# Patient Record
Sex: FEMALE | Race: OTHER | Employment: UNEMPLOYED | URBAN - METROPOLITAN AREA
[De-identification: names, ages, dates, MRNs, and addresses within clinical notes are randomized per-mention and may not be internally consistent; named-entity substitution may affect disease eponyms.]

---

## 2017-10-02 PROBLEM — Z34.80 PRENATAL CARE OF MULTIGRAVIDA, ANTEPARTUM: Status: ACTIVE | Noted: 2017-10-02

## 2018-01-22 PROBLEM — O34.219 PREVIOUS CESAREAN DELIVERY AFFECTING PREGNANCY: Status: ACTIVE | Noted: 2018-01-22

## 2018-02-15 PROBLEM — O99.012 ANEMIA DURING PREGNANCY IN SECOND TRIMESTER: Status: ACTIVE | Noted: 2018-02-15

## 2018-05-07 ENCOUNTER — ANESTHESIA EVENT (OUTPATIENT)
Dept: LABOR AND DELIVERY | Age: 33
End: 2018-05-07
Payer: COMMERCIAL

## 2018-05-07 ENCOUNTER — ANESTHESIA (OUTPATIENT)
Dept: LABOR AND DELIVERY | Age: 33
End: 2018-05-07
Payer: COMMERCIAL

## 2018-05-07 ENCOUNTER — HOSPITAL ENCOUNTER (INPATIENT)
Age: 33
LOS: 2 days | Discharge: HOME | End: 2018-05-09
Attending: OBSTETRICS & GYNECOLOGY | Admitting: OBSTETRICS & GYNECOLOGY
Payer: COMMERCIAL

## 2018-05-07 DIAGNOSIS — Z37.9 NORMAL LABOR: Primary | ICD-10-CM

## 2018-05-07 LAB
ABO + RH BLD: NORMAL
BLOOD BANK CMNT PATIENT-IMP: NORMAL
BLOOD GROUP ANTIBODIES SERPL: NORMAL
ERYTHROCYTE [DISTWIDTH] IN BLOOD BY AUTOMATED COUNT: 17.6 % (ref 11.9–14.6)
HCT VFR BLD AUTO: 41.5 % (ref 35.8–46.3)
HGB BLD-MCNC: 13.8 G/DL (ref 11.7–15.4)
MCH RBC QN AUTO: 28.2 PG (ref 26.1–32.9)
MCHC RBC AUTO-ENTMCNC: 33.3 G/DL (ref 31.4–35)
MCV RBC AUTO: 84.9 FL (ref 79.6–97.8)
PLATELET # BLD AUTO: 212 K/UL (ref 150–450)
PMV BLD AUTO: 12.3 FL (ref 10.8–14.1)
RBC # BLD AUTO: 4.89 M/UL (ref 4.05–5.25)
SPECIMEN EXP DATE BLD: NORMAL
WBC # BLD AUTO: 15.3 K/UL (ref 4.3–11.1)

## 2018-05-07 PROCEDURE — 65270000029 HC RM PRIVATE

## 2018-05-07 PROCEDURE — 36415 COLL VENOUS BLD VENIPUNCTURE: CPT | Performed by: OBSTETRICS & GYNECOLOGY

## 2018-05-07 PROCEDURE — 0KQM0ZZ REPAIR PERINEUM MUSCLE, OPEN APPROACH: ICD-10-PCS | Performed by: OBSTETRICS & GYNECOLOGY

## 2018-05-07 PROCEDURE — 77030003028 HC SUT VCRL J&J -A

## 2018-05-07 PROCEDURE — 77030010848 HC CATH INTUTR PRSS KOLB -B

## 2018-05-07 PROCEDURE — 77030011943

## 2018-05-07 PROCEDURE — 10907ZC DRAINAGE OF AMNIOTIC FLUID, THERAPEUTIC FROM PRODUCTS OF CONCEPTION, VIA NATURAL OR ARTIFICIAL OPENING: ICD-10-PCS | Performed by: OBSTETRICS & GYNECOLOGY

## 2018-05-07 PROCEDURE — 82803 BLOOD GASES ANY COMBINATION: CPT

## 2018-05-07 PROCEDURE — 77030014125 HC TY EPDRL BBMI -B: Performed by: ANESTHESIOLOGY

## 2018-05-07 PROCEDURE — 74011258636 HC RX REV CODE- 258/636: Performed by: OBSTETRICS & GYNECOLOGY

## 2018-05-07 PROCEDURE — 99283 EMERGENCY DEPT VISIT LOW MDM: CPT

## 2018-05-07 PROCEDURE — 86900 BLOOD TYPING SEROLOGIC ABO: CPT | Performed by: OBSTETRICS & GYNECOLOGY

## 2018-05-07 PROCEDURE — 74011250636 HC RX REV CODE- 250/636: Performed by: ANESTHESIOLOGY

## 2018-05-07 PROCEDURE — 77030011945 HC CATH URIN INT ST MENT -A

## 2018-05-07 PROCEDURE — 74011250636 HC RX REV CODE- 250/636: Performed by: OBSTETRICS & GYNECOLOGY

## 2018-05-07 PROCEDURE — 74011250637 HC RX REV CODE- 250/637: Performed by: ANESTHESIOLOGY

## 2018-05-07 PROCEDURE — 4A1HXCZ MONITORING OF PRODUCTS OF CONCEPTION, CARDIAC RATE, EXTERNAL APPROACH: ICD-10-PCS | Performed by: OBSTETRICS & GYNECOLOGY

## 2018-05-07 PROCEDURE — A4300 CATH IMPL VASC ACCESS PORTAL: HCPCS | Performed by: ANESTHESIOLOGY

## 2018-05-07 PROCEDURE — 74011250636 HC RX REV CODE- 250/636

## 2018-05-07 PROCEDURE — 77030018846 HC SOL IRR STRL H20 ICUM -A

## 2018-05-07 PROCEDURE — 85027 COMPLETE CBC AUTOMATED: CPT | Performed by: OBSTETRICS & GYNECOLOGY

## 2018-05-07 PROCEDURE — 77030005518 HC CATH URETH FOL 2W BARD -B

## 2018-05-07 RX ORDER — DIPHENHYDRAMINE HCL 25 MG
25 CAPSULE ORAL
Status: DISCONTINUED | OUTPATIENT
Start: 2018-05-07 | End: 2018-05-09 | Stop reason: HOSPADM

## 2018-05-07 RX ORDER — BUTORPHANOL TARTRATE 2 MG/ML
1 INJECTION INTRAMUSCULAR; INTRAVENOUS
Status: DISCONTINUED | OUTPATIENT
Start: 2018-05-07 | End: 2018-05-08 | Stop reason: HOSPADM

## 2018-05-07 RX ORDER — SODIUM CHLORIDE 0.9 % (FLUSH) 0.9 %
5-10 SYRINGE (ML) INJECTION AS NEEDED
Status: DISCONTINUED | OUTPATIENT
Start: 2018-05-07 | End: 2018-05-08 | Stop reason: HOSPADM

## 2018-05-07 RX ORDER — MINERAL OIL
120 OIL (ML) ORAL
Status: DISCONTINUED | OUTPATIENT
Start: 2018-05-07 | End: 2018-05-08 | Stop reason: HOSPADM

## 2018-05-07 RX ORDER — SODIUM CHLORIDE 0.9 % (FLUSH) 0.9 %
5-10 SYRINGE (ML) INJECTION AS NEEDED
Status: DISCONTINUED | OUTPATIENT
Start: 2018-05-07 | End: 2018-05-08 | Stop reason: ALTCHOICE

## 2018-05-07 RX ORDER — NALOXONE HYDROCHLORIDE 0.4 MG/ML
0.4 INJECTION, SOLUTION INTRAMUSCULAR; INTRAVENOUS; SUBCUTANEOUS AS NEEDED
Status: DISCONTINUED | OUTPATIENT
Start: 2018-05-07 | End: 2018-05-09 | Stop reason: HOSPADM

## 2018-05-07 RX ORDER — OXYTOCIN/0.9 % SODIUM CHLORIDE 15/250 ML
250 PLASTIC BAG, INJECTION (ML) INTRAVENOUS ONCE
Status: COMPLETED | OUTPATIENT
Start: 2018-05-07 | End: 2018-05-07

## 2018-05-07 RX ORDER — FAMOTIDINE 20 MG/1
20 TABLET, FILM COATED ORAL ONCE
Status: COMPLETED | OUTPATIENT
Start: 2018-05-07 | End: 2018-05-07

## 2018-05-07 RX ORDER — DOCUSATE SODIUM 100 MG/1
100 CAPSULE, LIQUID FILLED ORAL 2 TIMES DAILY
Status: DISCONTINUED | OUTPATIENT
Start: 2018-05-08 | End: 2018-05-09 | Stop reason: HOSPADM

## 2018-05-07 RX ORDER — DEXTROSE, SODIUM CHLORIDE, SODIUM LACTATE, POTASSIUM CHLORIDE, AND CALCIUM CHLORIDE 5; .6; .31; .03; .02 G/100ML; G/100ML; G/100ML; G/100ML; G/100ML
125 INJECTION, SOLUTION INTRAVENOUS CONTINUOUS
Status: DISCONTINUED | OUTPATIENT
Start: 2018-05-07 | End: 2018-05-08 | Stop reason: ALTCHOICE

## 2018-05-07 RX ORDER — OXYCODONE HYDROCHLORIDE 5 MG/1
10 TABLET ORAL
Status: DISCONTINUED | OUTPATIENT
Start: 2018-05-07 | End: 2018-05-09 | Stop reason: HOSPADM

## 2018-05-07 RX ORDER — SIMETHICONE 80 MG
80 TABLET,CHEWABLE ORAL
Status: DISCONTINUED | OUTPATIENT
Start: 2018-05-07 | End: 2018-05-09 | Stop reason: HOSPADM

## 2018-05-07 RX ORDER — ROPIVACAINE HYDROCHLORIDE 2 MG/ML
INJECTION, SOLUTION EPIDURAL; INFILTRATION; PERINEURAL
Status: DISCONTINUED | OUTPATIENT
Start: 2018-05-07 | End: 2018-05-07 | Stop reason: HOSPADM

## 2018-05-07 RX ORDER — SODIUM CHLORIDE 0.9 % (FLUSH) 0.9 %
5-10 SYRINGE (ML) INJECTION EVERY 8 HOURS
Status: DISCONTINUED | OUTPATIENT
Start: 2018-05-07 | End: 2018-05-08 | Stop reason: ALTCHOICE

## 2018-05-07 RX ORDER — LIDOCAINE HYDROCHLORIDE 10 MG/ML
1 INJECTION INFILTRATION; PERINEURAL
Status: DISCONTINUED | OUTPATIENT
Start: 2018-05-07 | End: 2018-05-08 | Stop reason: HOSPADM

## 2018-05-07 RX ORDER — PROMETHAZINE HYDROCHLORIDE 25 MG/1
25 TABLET ORAL
Status: DISCONTINUED | OUTPATIENT
Start: 2018-05-07 | End: 2018-05-09 | Stop reason: HOSPADM

## 2018-05-07 RX ORDER — ACETAMINOPHEN 325 MG/1
650 TABLET ORAL
Status: DISCONTINUED | OUTPATIENT
Start: 2018-05-07 | End: 2018-05-09 | Stop reason: HOSPADM

## 2018-05-07 RX ORDER — HYDROMORPHONE HYDROCHLORIDE 2 MG/ML
1 INJECTION, SOLUTION INTRAMUSCULAR; INTRAVENOUS; SUBCUTANEOUS
Status: DISCONTINUED | OUTPATIENT
Start: 2018-05-07 | End: 2018-05-08 | Stop reason: ALTCHOICE

## 2018-05-07 RX ORDER — SODIUM CHLORIDE 0.9 % (FLUSH) 0.9 %
5-10 SYRINGE (ML) INJECTION EVERY 8 HOURS
Status: DISCONTINUED | OUTPATIENT
Start: 2018-05-07 | End: 2018-05-08 | Stop reason: HOSPADM

## 2018-05-07 RX ORDER — ZOLPIDEM TARTRATE 5 MG/1
5 TABLET ORAL
Status: DISCONTINUED | OUTPATIENT
Start: 2018-05-07 | End: 2018-05-09 | Stop reason: HOSPADM

## 2018-05-07 RX ORDER — LIDOCAINE HYDROCHLORIDE 20 MG/ML
JELLY TOPICAL
Status: DISCONTINUED | OUTPATIENT
Start: 2018-05-07 | End: 2018-05-08 | Stop reason: HOSPADM

## 2018-05-07 RX ADMIN — OXYTOCIN 15000 MILLI-UNITS/HR: 10 INJECTION, SOLUTION INTRAMUSCULAR; INTRAVENOUS at 23:30

## 2018-05-07 RX ADMIN — FAMOTIDINE 20 MG: 20 TABLET ORAL at 20:19

## 2018-05-07 RX ADMIN — SODIUM CHLORIDE, SODIUM LACTATE, POTASSIUM CHLORIDE, CALCIUM CHLORIDE, AND DEXTROSE MONOHYDRATE 125 ML/HR: 600; 310; 30; 20; 5 INJECTION, SOLUTION INTRAVENOUS at 19:14

## 2018-05-07 RX ADMIN — SODIUM CHLORIDE, SODIUM LACTATE, POTASSIUM CHLORIDE, AND CALCIUM CHLORIDE 1000 ML: 600; 310; 30; 20 INJECTION, SOLUTION INTRAVENOUS at 19:00

## 2018-05-07 RX ADMIN — ROPIVACAINE HYDROCHLORIDE 10 ML/HR: 2 INJECTION, SOLUTION EPIDURAL; INFILTRATION; PERINEURAL at 19:17

## 2018-05-07 NOTE — H&P
The patient is a 28 y.o. female who is seen for act labor tolac 4cm marcos . HISTORY:      Patient's last menstrual period was 07/31/2017 (exact date). Sexual History:    Contraception:    No current facility-administered medications on file prior to encounter. Current Outpatient Prescriptions on File Prior to Encounter   Medication Sig Dispense Refill    AMBULATORY BREAST PUMP Use as directed for feedings. 1 Pump(s) 0    ferrous sulfate (SLOW FE) 142 mg (45 mg iron) ER tablet Take  by mouth Daily (before breakfast).  ascorbic acid, vitamin C, (VITAMIN C) 500 mg tablet Take 1 Tab by mouth daily. 30 Tab 12    PNV 67-iron ps-folate no. 1-dha (VITAFOL ULTRA) 29 mg iron- 1 mg-200 mg cap Take 1 Tab by mouth daily. 90 Cap 4       ROS:  Feeling well. No dyspnea or chest pain on exertion. No abdominal pain, change in bowel habits, black or bloody stools. No urinary tract symptoms. GYN ROS: .    PHYSICAL EXAM:  Last menstrual period 07/31/2017. The patient appears well, alert, oriented x 3, in no distress. Lungs are clear. Heart RRR, no murmurs. Abdomen soft without tenderness, guarding, mass or organomegaly. Pelvic: . ASSESSMENT:  No diagnosis found. PLAN:    Orders Placed This Encounter    NO VTE PROPHYLAXIS NEEDED     Standing Status:   Standing     Number of Occurrences:   1     Order Specific Question:   Due to     Answer:   Prophylaxis is Not Indicated    INITIATE PROTOCOL     Standing Status:   Standing     Number of Occurrences:   1     Order Specific Question:   Initiate Protocol     Answer:   Labor / Vaginal Delivery    CBC W/O DIFF     Standing Status:   Standing     Number of Occurrences:   1    DIET NPO With Ice Chips     And sips     Standing Status:   Standing     Number of Occurrences:   1     Order Specific Question:   NPO options: Answer: With [employer]    VITAL SIGNS PER UNIT ROUTINE     More frequently if indicated.      Standing Status:   Standing     Number of Occurrences:   1    AMBULATE PATIENT     Ambulate prior to ROM per unit protocol. Standing Status:   Standing     Number of Occurrences:   1    FETAL MONITORING     Standing Status:   Standing     Number of Occurrences:   1    NOTIFY PROVIDER: VITAL SIGNS CHANGES     FHT's Greater than 160 Beats Per Minute or less than 110 Beats Per Minute     Standing Status:   Standing     Number of Occurrences:   1     Order Specific Question:   Notify for Temperature: Answer:   Greater than 100.4 F     Order Specific Question:   Notify for Heart Rate: Answer:   Greater than 120 Beats Per Minute     Order Specific Question:   Notify for Respiratory Rate: Answer:   Greater than 30     Order Specific Question:   Notify for Systolic Blood Pressure: Answer:   Greater than 160  mm Hg or Less than 90 mm Hg    EPIDURAL CATHETER CARE     Catheter Care and Management     Standing Status:   Standing     Number of Occurrences:   1    INTAKE AND OUTPUT     Standing Status:   Standing     Number of Occurrences:   1    STRAIGHT CATHETER (NURSING) PRN Routine As needed for bladder distention/management if epidural is in place. As needed for bladder distention/management if epidural is in place. Standing Status:   Standing     Number of Occurrences:   1    FULL CODE     Standing Status:   Standing     Number of Occurrences:   1    IP CONSULT TO ANESTHESIOLOGY     Standing Status:   Standing     Number of Occurrences:   1     Order Specific Question:   Reason for Consult: Answer: For epidural placement as needed. Order Specific Question:   Did you call or speak to the consulting provider? Answer: Yes    TYPE AND SCREEN     ENTER SURGERY DATE IF FOR PRE-OP TESTING. Standing Status:   Standing     Number of Occurrences:   1     Order Specific Question:   Has patient been transfused or pregnant in the last 3 mos. ?      Answer:   Unknown    SALINE LOCK IV ONE TIME Routine     Standing Status: Standing     Number of Occurrences:   1    dextrose 5% lactated ringers infusion    lactated ringers bolus infusion 500 mL    sodium chloride (NS) flush 5-10 mL    sodium chloride (NS) flush 5-10 mL    oxytocin (PITOCIN) 15 units/250 ml NS    butorphanol (STADOL) injection 1 mg    lidocaine (XYLOCAINE) 10 mg/mL (1 %) injection 0.1 mL    mineral oil 120 mL    lidocaine (XYLOCAINE) 2 % jelly    INITIAL PHYSICIAN ORDER: INPATIENT L&D; 3. Patient receiving treatment that can only be provided in an inpatient setting (further clarification in H&P documentation)     Standing Status:   Standing     Number of Occurrences:   1     Order Specific Question:   Status: Answer:   Inpatient [101]     Order Specific Question:   Type of Bed     Answer:   L&D [7]     Order Specific Question:   Inpatient Hospitalization Certified Necessary for the Following Reasons     Answer:   3.  Patient receiving treatment that can only be provided in an inpatient setting (further clarification in H&P documentation)     Order Specific Question:   Admitting Diagnosis     Answer:   Normal labor [[id number]]     Order Specific Question:   Admitting Physician     Answer:   Bill Daniels     Order Specific Question:   Attending Physician     Answer:   Nataly Ochoa [[id number]]     Order Specific Question:   Estimated Length of Stay     Answer:   2 Midnights     Order Specific Question:   Discharge Plan:     Answer:   Home with Office Follow-up     4cm tolac

## 2018-05-07 NOTE — IP AVS SNAPSHOT
[address] [facility] [address] [facility] [address] 
[phone] Patient: GLENN HARRISON MRN: [id number] MFS:[id number] About your hospitalization You were admitted on: May 7, 2018 You last received care in the:  [address] [facility] You were discharged on:  May 9, 2018 Why you were hospitalized Your primary diagnosis was:  Not on File Your diagnoses also included:  Normal Labor Follow-up Information Follow up With Details Comments Contact Info Lea Alcaraz MD Schedule an appointment as soon as possible for a visit in 6 weeks  [address] [address] 
[phone] Ghada Dooley MD   Patient can only remember the practice name and not the physician Your Scheduled Appointments Wednesday June 20, 2018  9:45 AM EDT POSTPARTUM VISIT with Fernie Paris MD  
[facility] ([facility]) [address] [address] [phone] Discharge Orders None A check laz indicates which time of day the medication should be taken. My Medications START taking these medications Instructions Each Dose to Equal  
 Morning Noon Evening Bedtime  
 ibuprofen 800 mg tablet Commonly known as:  MOTRIN Your last dose was: Your next dose is: Take 1 Tab by mouth every eight (8) hours as needed for Pain. 800 mg  
    
   
   
   
  
 oxyCODONE IR 5 mg immediate release tablet Commonly known as:  [place] Your last dose was: Your next dose is: Take 1 Tab by mouth every four (4) hours as needed for Pain. Max Daily Amount: 30 mg.  
 5 mg CONTINUE taking these medications Instructions Each Dose to Equal  
 Morning Noon Evening Bedtime AMBULATORY BREAST PUMP Your last dose was: Your next dose is:    
   
   
 Use as directed for feedings. ascorbic acid (vitamin C) 500 mg tablet Commonly known as:  VITAMIN C Your last dose was: Your next dose is: Take 1 Tab by mouth daily. 500 mg PNV 67-iron ps-folate no. 1-dha 29 mg iron- 1 mg-200 mg Cap Commonly known as:  [place] Your last dose was: Your next dose is: Take 1 Tab by mouth daily. 1 Tab  
    
   
   
   
  
 SLOW  mg (45 mg iron) ER tablet Generic drug:  ferrous sulfate Your last dose was: Your next dose is: Take  by mouth Daily (before breakfast). Where to Get Your Medications These medications were sent to [facility] #[id number]  [place] [address], [address] [place] [address] Phone:  [phone]  
  ibuprofen 800 mg tablet Information on where to get these meds will be given to you by the nurse or doctor. ! Ask your nurse or doctor about these medications  
  oxyCODONE IR 5 mg immediate release tablet Opioid Education Prescription Opioids: What You Need to Know: 
 
Prescription opioids can be used to help relieve moderate-to-severe pain and are often prescribed following a surgery or injury, or for certain health conditions. These medications can be an important part of treatment but also come with serious risks. Opioids are strong pain medicines. Examples include hydrocodone, oxycodone, fentanyl, and morphine. Heroin is an example of an illegal opioid. It is important to work with your health care provider to make sure you are getting the safest, most effective care. WHAT ARE THE RISKS AND SIDE EFFECTS OF OPIOID USE? Prescription opioids carry serious risks of addiction and overdose, especially with prolonged use.   An opioid overdose, often marked by slow breathing, can cause sudden death. The use of prescription opioids can have a number of side effects as well, even when taken as directed. · Tolerance-meaning you might need to take more of a medication for the same pain relief · Physical dependence-meaning you have symptoms of withdrawal when the medication is stopped. Withdrawal symptoms can include nausea, sweating, chills, diarrhea, stomach cramps, and muscle aches. Withdrawal can last up to several weeks, depending on which drug you took and how long you took it. · Increased sensitivity to pain · Constipation · Nausea, vomiting, and dry mouth · Sleepiness and dizziness · Confusion · Depression · Low levels of testosterone that can result in lower sex drive, energy, and strength · Itching and sweating RISKS ARE GREATER WITH:      
· History of drug misuse, substance use disorder, or overdose · Mental health conditions (such as depression or anxiety) · Sleep apnea · Older age (72 years or older) · Pregnancy Avoid alcohol while taking prescription opioids. Also, unless specifically advised by your health care provider, medications to avoid include: · Benzodiazepines (such as Xanax or Valium) · Muscle relaxants (such as Soma or Flexeril) · Hypnotics (such as Ambien or Lunesta) · Other prescription opioids KNOW YOUR OPTIONS Talk to your health care provider about ways to manage your pain that don't involve prescription opioids. Some of these options may actually work better and have fewer risks and side effects. Options may include: 
· Pain relievers such as acetaminophen, ibuprofen, and naproxen · Some medications that are also used for depression or seizures · Physical therapy and exercise · Counseling to help patients learn how to cope better with triggers of pain and stress. · Application of heat or cold compress · Massage therapy · Relaxation techniques Be Informed Make sure you know the name of your medication, how much and how often to take it, and its potential risks & side effects. IF YOU ARE PRESCRIBED OPIOIDS FOR PAIN: 
· Never take opioids in greater amounts or more often than prescribed. Remember the goal is not to be pain-free but to manage your pain at a tolerable level. · Follow up with your primary care provider to: · Work together to create a plan on how to manage your pain. · Talk about ways to help manage your pain that don't involve prescription opioids. · Talk about any and all concerns and side effects. · Help prevent misuse and abuse. · Never sell or share prescription opioids · Help prevent misuse and abuse. · Store prescription opioids in a secure place and out of reach of others (this may include visitors, children, friends, and family). · Safely dispose of unused/unwanted prescription opioids: Find your community drug take-back program or your pharmacy mail-back program, or flush them down the toilet, following guidance from the Food and Drug Administration (www.fda.gov/Drugs/ResourcesForYou). · Visit www.cdc.gov/drugoverdose to learn about the risks of opioid abuse and overdose. · If you believe you may be struggling with addiction, tell your health care provider and ask for guidance or call [employer] at 8-509-930-XXAD. Discharge Instructions After Your Delivery (the Postpartum Period): Care Instructions Your Care Instructions Congratulations on the birth of your baby. Like pregnancy, the  period can be a time of excitement, tri, and exhaustion. You may look at your wondrous little baby and feel happy. You may also be overwhelmed by your new sleep hours and new responsibilities. At first, babies often sleep during the days and are awake at night. They do not have a pattern or routine.  They may make sudden gasps, jerk themselves awake, or look like they have crossed eyes. These are all normal, and they may even make you smile. In these first weeks after delivery, try to take good care of yourself. It may take 4 to 6 weeks to feel like yourself again, and possibly longer if you had a  birth. You will likely feel very tired for several weeks. Your days will be full of ups and downs, but lots of tri as well. Follow-up care is a key part of your treatment and safety. Be sure to make and go to all appointments, and call your doctor if you are having problems. It's also a good idea to know your test results and keep a list of the medicines you take. How can you care for yourself at home? Take care of your body after delivery · Use pads instead of tampons for the bloody flow that may last as long as 2 weeks. · Ease cramps with ibuprofen (Advil, Motrin). · Ease soreness of hemorrhoids and the area between your vagina and rectum with ice compresses or witch hazel pads. · Ease constipation by drinking lots of fluid and eating high-fiber foods. Ask your doctor about over-the-counter stool softeners. · Cleanse yourself with a gentle squeeze of warm water from a bottle instead of wiping with toilet paper. · Take a sitz bath in warm water several times a day. · Wear a good nursing bra. Ease sore and swollen breasts with warm, wet washcloths. · If you are not breastfeeding, use ice rather than heat for breast soreness. · Your period may not start for several months if you are breastfeeding. You may bleed more, and longer at first, than you did before you got pregnant. · Wait until you are healed (about 4 to 6 weeks) before you have sexual intercourse. Your doctor will tell you when it is okay to have sex. · Try not to travel with your baby for 5 or 6 weeks. If you take a long car trip, make frequent stops to walk around and stretch. Avoid exhaustion · Rest every day. Try to nap when your baby naps. · Ask another adult to be with you for a few days after delivery. · Plan for  if you have other children. · Stay flexible so you can eat at odd hours and sleep when you need to. Both you and your baby are making new schedules. · Plan small trips to get out of the house. Change can make you feel less tired. · Ask for help with housework, cooking, and shopping. Remind yourself that your job is to care for your baby. Know about help for postpartum depression · \"Baby blues\" are common for the first 1 to 2 weeks after birth. You may cry or feel sad or irritable for no reason. · Rest whenever you can. Being tired makes it harder to handle your emotions. · Go for walks with your baby. · Talk to your partner, friends, and family about your feelings. · If your symptoms last for more than a few weeks, or if you feel very depressed, ask your doctor for help. · Postpartum depression can be treated. Support groups and counseling can help. Sometimes medicine can also help. Stay healthy · Eat healthy foods so you have more energy, make good breast milk, and lose extra baby pounds. · If you breastfeed, avoid alcohol and drugs. Stay smoke-free. If you quit during pregnancy, congratulations. · Start daily exercise after 4 to 6 weeks, but rest when you feel tired. · Learn exercises to tone your belly. Do Kegel exercises to regain strength in your pelvic muscles. You can do these exercises while you stand or sit. ¨ Squeeze the same muscles you would use to stop your urine. Your belly and thighs should not move. ¨ Hold the squeeze for 3 seconds, and then relax for 3 seconds. ¨ Start with 3 seconds. Then add 1 second each week until you are able to squeeze for 10 seconds. ¨ Repeat the exercise 10 to 15 times for each session. Do three or more sessions each day. · Find a class for new mothers and new babies that has an exercise time.  
· If you had a  birth, give yourself a bit more time before you exercise, and be careful. When should you call for help? Call 911 anytime you think you may need emergency care. For example, call if: 
? · You passed out (lost consciousness). ?Call your doctor now or seek immediate medical care if: 
? · You have severe vaginal bleeding. This means you are passing blood clots and soaking through a pad each hour for 2 or more hours. ? · You are dizzy or lightheaded, or you feel like you may faint. ? · You have a fever. ? · You have new belly pain, or your pain gets worse. ? Watch closely for changes in your health, and be sure to contact your doctor if: 
? · Your vaginal bleeding seems to be getting heavier. ? · You have new or worse vaginal discharge. ? · You feel sad, anxious, or hopeless for more than a few days. ? · You do not get better as expected. Where can you learn more? Go to http://devin-radha.info/. Enter A461 in the search box to learn more about \"After Your Delivery (the Postpartum Period): Care Instructions. \" Current as of: March 16, 2017 Content Version: 11.4 © 3592-8883 [employer]. Care instructions adapted under license by [employer] (which disclaims liability or warranty for this information). If you have questions about a medical condition or this instruction, always ask your healthcare professional. [address] any warranty or liability for your use of this information. DoubleMap Announcement We are excited to announce that we are making your provider's discharge notes available to you in DoubleMap. You will see these notes when they are completed and signed by the physician that discharged you from your recent hospital stay.   If you have any questions or concerns about any information you see in DoubleMap, please call the Health Information Department where you were seen or reach out to your Primary Care Provider for more information about your plan of care. Introducing [facility]! Dear Karlee Rosas: Thank you for requesting a TripletPlus account. Our records indicate that you already have an active TripletPlus account. You can access your account anytime at [email or web address] Did you know that you can access your hospital and ER discharge instructions at any time in TripletPlus? You can also review all of your test results from your hospital stay or ER visit. Additional Information If you have questions, please visit the Frequently Asked Questions section of the TripletPlus website at [email or web address] Remember, TripletPlus is NOT to be used for urgent needs. For medical emergencies, dial 911. Now available from your iPhone and Android! Introducing Igor Hampton As a [employer] patient, I wanted to make you aware of our electronic visit tool called Igor Hampton. [employer] 24/7 allows you to connect within minutes with a medical provider 24 hours a day, seven days a week via a mobile device or tablet or logging into a secure website from your computer. You can access Igor Hampton from anywhere in the [place]. A virtual visit might be right for you when you have a simple condition and feel like you just dont want to get out of bed, or cant get away from work for an appointment, when your regular [employer] provider is not available (evenings, weekends or holidays), or when youre out of town and need minor care. Electronic visits cost only $49 and if the [employer] 24/7 provider determines a prescription is needed to treat your condition, one can be electronically transmitted to a nearby pharmacy*. Please take a moment to enroll today if you have not already done so. The enrollment process is free and takes just a few minutes.   To enroll, please download the [employer] 24/7 zakiya to your tablet or phone, or visit [email or web address] to enroll on your computer. And, as an [address] [place] [address] patient with a [employer] account, the results of your visits will be scanned into your electronic medical record and your primary care provider will be able to view the scanned results. We urge you to continue to see your regular [facility] provider for your ongoing medical care. And while your primary care provider may not be the one available when you seek a [employer] virtual visit, the peace of mind you get from getting a real diagnosis real time can be priceless. For more information on [employer], view our Frequently Asked Questions (FAQs) at [email or web address]. Sincerely, 
 
Kaitlin Eastman MD 
Chief Medical Officer [facility] Gracie Mahmood *:  certain medications cannot be prescribed via Dexrex Gear Providers Seen During Your Hospitalization Provider Specialty Primary office phone Connie Poe MD Obstetrics & Gynecology [phone] Immunizations Administered for This Admission Name Date MMR 5/9/2018 Tdap  Deferred () Your Primary Care Physician (PCP) Primary Care Physician Office Phone Office Fax OTHER, PHYS ** None ** ** None ** You are allergic to the following No active allergies Recent Documentation Height Weight Breastfeeding? BMI OB Status Smoking Status 1.651 m 68.9 kg Yes 25.29 kg/m2 Recent pregnancy Never Smoker Emergency Contacts Name Discharge Info Relation Home Work Mobile rTung Singletary  Spouse [3] [phone] Patient Belongings The following personal items are in your possession at time of discharge: 
  Dental Appliances: None  Visual Aid: None      Home Medications: None   Jewelry: Earrings  Clothing: Pants, Shirt, With patient    Other Valuables: Cell Phone, Madalyn Bane, With patient  Personal Items Sent to Safe: none Please provide this summary of care documentation to your next provider. Signatures-by signing, you are acknowledging that this After Visit Summary has been reviewed with you and you have received a copy. Patient Signature:  ____________________________________________________________ Date:  ____________________________________________________________  
  
Rexann Ports Provider Signature:  ____________________________________________________________ Date:  ____________________________________________________________

## 2018-05-07 NOTE — IP AVS SNAPSHOT
[address] 
[phone] Patient: GLENN HARRISON MRN: [id number] FUO:[id number] A check laz indicates which time of day the medication should be taken. My Medications START taking these medications Instructions Each Dose to Equal  
 Morning Noon Evening Bedtime  
 ibuprofen 800 mg tablet Commonly known as:  MOTRIN Your last dose was: Your next dose is: Take 1 Tab by mouth every eight (8) hours as needed for Pain. 800 mg  
    
   
   
   
  
 oxyCODONE IR 5 mg immediate release tablet Commonly known as:  Maegan Cerrato Your last dose was: Your next dose is: Take 1 Tab by mouth every four (4) hours as needed for Pain. Max Daily Amount: 30 mg.  
 5 mg CONTINUE taking these medications Instructions Each Dose to Equal  
 Morning Noon Evening Bedtime AMBULATORY BREAST PUMP Your last dose was: Your next dose is:    
   
   
 Use as directed for feedings. ascorbic acid (vitamin C) 500 mg tablet Commonly known as:  VITAMIN C Your last dose was: Your next dose is: Take 1 Tab by mouth daily. 500 mg PNV 67-iron ps-folate no. 1-dha 29 mg iron- 1 mg-200 mg Cap Commonly known as:  Drew Deacon Your last dose was: Your next dose is: Take 1 Tab by mouth daily. 1 Tab  
    
   
   
   
  
 SLOW  mg (45 mg iron) ER tablet Generic drug:  ferrous sulfate Your last dose was: Your next dose is: Take  by mouth Daily (before breakfast). Where to Get Your Medications These medications were sent to [facility]/pharmacy #[id number] Beltre [address] [place] [address] 1284, [address] [facility] [address] Phone:  [phone]  
  ibuprofen 800 mg tablet Information on where to get these meds will be given to you by the nurse or doctor. ! Ask your nurse or doctor about these medications  
  oxyCODONE IR 5 mg immediate release tablet

## 2018-05-07 NOTE — ANESTHESIA PROCEDURE NOTES
Epidural Block    Start time: 5/7/2018 7:14 PM  End time: 5/7/2018 7:20 PM  Performed by: Zeb Seals by: Liset MENDOZA     Pre-Procedure  Indication: labor epidural    Preanesthetic Checklist: patient identified, risks and benefits discussed, anesthesia consent, site marked, patient being monitored, timeout performed and anesthesia consent    Timeout Time: 19:14        Epidural:   Patient position:  Seated  Prep region:  Lumbar  Prep: Chlorhexidine    Location:  L3-4    Needle and Epidural Catheter:   Needle Type:  Tuohy  Needle Gauge:  17 G  Injection Technique:  Loss of resistance using air  Attempts:  1  Catheter Size:  18 G  Depth in Epidural Space (cm):  3  Events: no blood with aspiration, no cerebrospinal fluid with aspiration, no paresthesia and negative aspiration test    Epidural test dose: .75% LIDOCAINE WITH EPI.     Assessment:   Catheter Secured:  Tegaderm and tape  Insertion:  Uncomplicated  Patient tolerance:  Patient tolerated the procedure well with no immediate complications

## 2018-05-07 NOTE — ANESTHESIA PREPROCEDURE EVALUATION
Anesthetic History               Review of Systems / Medical History  Patient summary reviewed, nursing notes reviewed and pertinent labs reviewed    Pulmonary                   Neuro/Psych              Cardiovascular                  Exercise tolerance: >4 METS     GI/Hepatic/Renal                Endo/Other             Other Findings              Physical Exam    Airway  Mallampati: II  TM Distance: 4 - 6 cm  Neck ROM: normal range of motion   Mouth opening: Normal     Cardiovascular  Regular rate and rhythm,  S1 and S2 normal,  no murmur, click, rub, or gallop             Dental  No notable dental hx       Pulmonary  Breath sounds clear to auscultation               Abdominal         Other Findings            Anesthetic Plan    ASA: 2  Anesthesia type: epidural          Induction: Intravenous  Anesthetic plan and risks discussed with: Patient

## 2018-05-08 LAB
BASE DEFICIT BLDCOA-SCNC: 1.8 MMOL/L (ref 0–2)
BASE DEFICIT BLDCOV-SCNC: 1.5 MMOL/L (ref 1.9–7.7)
BDY SITE: ABNORMAL
BDY SITE: ABNORMAL
HCO3 BLDCOA-SCNC: 25 MMOL/L (ref 22–26)
HCO3 BLDV-SCNC: 25 MMOL/L
PCO2 BLDCOA: 47 MMHG (ref 33–49)
PCO2 BLDCOV: 47 MMHG (ref 14.1–43.3)
PH BLDCOA: 7.33 [PH] (ref 7.21–7.31)
PH BLDCOV: 7.34 [PH] (ref 7.2–7.44)
PO2 BLDCOA: 29 MMHG (ref 9–19)
PO2 BLDV: 27 MMHG (ref 30.4–57.2)
SERVICE CMNT-IMP: ABNORMAL
SERVICE CMNT-IMP: ABNORMAL

## 2018-05-08 PROCEDURE — 75410000000 HC DELIVERY VAGINAL/SINGLE

## 2018-05-08 PROCEDURE — 65270000029 HC RM PRIVATE

## 2018-05-08 PROCEDURE — 75410000003 HC RECOV DEL/VAG/CSECN EA 0.5 HR

## 2018-05-08 PROCEDURE — 76060000078 HC EPIDURAL ANESTHESIA

## 2018-05-08 PROCEDURE — 74011250637 HC RX REV CODE- 250/637: Performed by: OBSTETRICS & GYNECOLOGY

## 2018-05-08 PROCEDURE — 75410000002 HC LABOR FEE PER 1 HR

## 2018-05-08 RX ADMIN — ACETAMINOPHEN 650 MG: 325 TABLET ORAL at 02:39

## 2018-05-08 RX ADMIN — DOCUSATE SODIUM 100 MG: 100 CAPSULE, LIQUID FILLED ORAL at 17:42

## 2018-05-08 RX ADMIN — ACETAMINOPHEN 650 MG: 325 TABLET ORAL at 06:36

## 2018-05-08 RX ADMIN — ACETAMINOPHEN 650 MG: 325 TABLET ORAL at 17:47

## 2018-05-08 RX ADMIN — DOCUSATE SODIUM 100 MG: 100 CAPSULE, LIQUID FILLED ORAL at 08:04

## 2018-05-08 RX ADMIN — OXYCODONE HYDROCHLORIDE 10 MG: 5 TABLET ORAL at 06:36

## 2018-05-08 RX ADMIN — OXYCODONE HYDROCHLORIDE 10 MG: 5 TABLET ORAL at 02:39

## 2018-05-08 RX ADMIN — OXYCODONE HYDROCHLORIDE 10 MG: 5 TABLET ORAL at 17:47

## 2018-05-08 RX ADMIN — WITCH HAZEL 1 PAD: 500 SOLUTION RECTAL; TOPICAL at 04:33

## 2018-05-08 NOTE — PROGRESS NOTES
Admission assessment complete. Pt resting in bed with family present. Encouraged pt to call out with any questions or concerns and for assistance before getting out of bed. Pt verbalizes understanding.

## 2018-05-08 NOTE — PROGRESS NOTES
Chart reviewed - no needs identified.  made introduction to family and provided informational packet on  mood disorder education/resources. Family receptive to receiving information and denied any additional needs from . Family has this 's contact information should any needs/questions arise.     Cezar Houston, [address] [facility]

## 2018-05-08 NOTE — PROGRESS NOTES
Pt has arrived to L&D with reports of contractions. Pt denies any leaking of fluids but has to stop and breath with her contractions and rates them an 8 out of 10. Plan of care discussed with pt and understanding was verbalized. Pt placed on EFM.

## 2018-05-08 NOTE — PROGRESS NOTES
Dr. Julie Hodgkin at bedside at 1927. SVE 4/90/-1. AROM clear, small to moderate fluid at 1930 and IUPC placed at 1931.

## 2018-05-08 NOTE — PROGRESS NOTES
of viable female infant at 2326. Infant vigorous with apgars 9/9. Placenta delivered at 4245 with no complications.

## 2018-05-08 NOTE — LACTATION NOTE
This note was copied from a baby's chart. First visit. 2nd time mom. Pumped and bottle fed x7 months with first child. Mom attempting to feed baby in cradle hold on left breast at time of visit. Infant latched to left breast but shallow. Assisted mom to re latch baby, compressing breast, for deeper latch. Infant fed x15 min. Mom wanted to switch sides. Demonstrated hand expression. Easily expressed drops. Encouraged cross cradle vs traditional cradle hold. Mom able to latch baby to right breast. Infant fed x10 min. Mom asked to stop and swaddle and baby so she could rest. Assisted mom to break latch, swaddled baby and placed in bassinet. Encouraged to watch for feeding cues, feed on demand. Encouraged at least 15-20 min on first breast and offer second breast at each feeding. Reviewed expectations for first 24 hours of life and baby's second night. Encouraged at least 8 feedings in 24 hours. Lactation to follow up tomorrow.

## 2018-05-08 NOTE — PROGRESS NOTES
Pt requests pain medication for verbalized pain level of 7/10. 650mg Tylenol and 10mg Roxicodone administered PO. RN to reassess.

## 2018-05-08 NOTE — LACTATION NOTE

## 2018-05-08 NOTE — PROGRESS NOTES
Post-Partum Day Number 1 Progress Note    Patient doing well post-partum without significant complaint. Voiding without difficulty, normal lochia. Vitals:  Patient Vitals for the past 8 hrs:   BP Temp Pulse Resp SpO2   18 0657 100/63 98.4 °F (36.9 °C) 62 16 98 %     Temp (24hrs), Av.8 °F (37.1 °C), Min:97.6 °F (36.4 °C), Max:99.8 °F (37.7 °C)      Vital signs stable, afebrile. Exam:  Patient without distress. Abdomen soft, fundus firm at level of umbilicus, nontender               Perineum with normal lochia noted. Lower extremities are negative for swelling, cords or tenderness. Lab/Data Review: All lab results for the last 24 hours reviewed.       Lab Results   Component Value Date/Time    ABO/Rh(D) A POSITIVE 2018 06:19 PM    Antibody screen, External Negative 2017    Antibody screen NEG 2018 06:19 PM    Rubella, External Non-Immune 2017    GrBStrep, External negative 2018    HBsAg, External Negative 2017    HIV, External Negative 2017    RPR, External Negative 2017    Gonorrhea, External Negative 2017    Chlamydia, External Negative 2017    ABO,Rh A Positive 2017      Problem List  Date Reviewed: 2018          Codes Class Noted    Normal labor ICD-10-CM: O80, Z37.9  ICD-9-CM: 650  2018        Anemia during pregnancy in second trimester ICD-10-CM: O99.012  ICD-9-CM: 648.23  2/15/2018    Overview Addendum 3/12/2018  8:37 AM by Brook Dorsey MD     hgb () 9.4  hgb electro wnl   POC hgb today 8.8 (3/12) at 32wks  reports compliance w/ QD FeSO4  Increase to BID w/ Vit C ; recheck NV--possible heme/onc referral if not responding              Previous  delivery affecting pregnancy ICD-10-CM: O34.219  ICD-9-CM: 654.20  2018    Overview Addendum 3/12/2018  8:11 AM by Brook Dorsey MD     Due to NRFHTs at term by pt description in [place]   Desires TOLAC   Placenta anterior               Prenatal care of multigravida, antepartum ICD-10-CM: Z34.80  ICD-9-CM: V22.1  10/2/2017    Overview Addendum 2/15/2018 10:21 AM by Colby King MD     1)prenatal labs nl. Nl hgb electro. Immune to VZ  2)rubella nonimmune  3)previous C/S. Wt 3.25kg. Sounds like fetal intolerance. Pt interested in . 4)ANEMIA                 Assessment and Plan:  Patient appears to be having uncomplicated post-partum course. Continue routine perineal care and maternal education. Plan discharge tomorrow if no problems occur. Rh positive, rubella nonimmune  Breastfeeding strategies reviewed. Pt is an experienced breastfeeder.   SIDs  precautions reviewed

## 2018-05-08 NOTE — PROGRESS NOTES
Assisted pt up to restroom. Pt voided 200mL dark urine. Instructed pt how to use tisha bottle and tucks pads. Tisha-pad and gown changed. Ice pack applied to perineum. Pt ambulated back to bed unassisted and denies further needs at this time. Encouraged pt to increase fluid intake and to measure one more void, pt verbalizes understanding.

## 2018-05-08 NOTE — L&D DELIVERY NOTE
Delivery Summary    Patient: Maine Calvin MRN: [id number]  SSN: [id number]    YOB: 1985  Age: 28 y.o. Sex: female       Information for the patient's :  Yale Krabbe [[id number]]       Labor Events:    Labor: No   Rupture Date: 2018   Rupture Time: 7:30 PM   Rupture Type AROM   Amniotic Fluid Volume: Moderate    Amniotic Fluid Description: Clear None   Induction: None       Augmentation: AROM   Labor Events: None     Cervical Ripening:     None     Delivery Events:  Episiotomy: Midline   Laceration(s): Second degree perineal     Repaired:      Number of Repair Packets:     Suture Type and Size: Vicryl 2-0  Vicryl 3-0     Estimated Blood Loss (ml): 300ml       Delivery Date: 2018    Delivery Time: 11:26 PM  Delivery Type: , Spontaneous  Sex:  Female     Gestational Age: 37w0d   Delivery Clinician:  Jose Herrera  Living Status: Living   Delivery Location: L&D 436          APGARS  One minute Five minutes Ten minutes   Skin color: 1   1        Heart rate: 2   2        Grimace: 2   2        Muscle tone: 2   2        Breathin   2        Totals: 9   9            Presentation: Vertex    Position: Left Occiput Anterior  Resuscitation Method:  Suctioning-bulb; Tactile Stimulation     Meconium Stained: Other (Comment) terminal meconium at delivery    Cord Vessels: 3 Vessels      Cord Events:    Cord Blood Sent?:  Yes    Blood Gases Sent?:  Yes    Placenta:  Date/Time:  11:30 PM  Removal: Spontaneous      Appearance: Normal      Measurements:  Birth Weight: 7 lb 3.5 oz (3.275 kg)      Birth Length: 52.1 cm      Head Circumference: 34 cm      Chest Circumference: 33 cm     Abdominal Girth:       Other Providers:   Rafael CHRISTIANSON;SANDRA WHALEN;ELIN BARRON;;;Mitchel MACIAS MARK A;;;;Godwin BARRON, Obstetrician;Primary Nurse;Primary [place] Nurse;Nicu Nurse;Neonatologist;Anesthesiologist;Crna;Nurse Practitioner;Midwife;Nursery Nurse;Charge Nurse;Scrub Tech;Respiratory Therapist           Group B Strep:   Lab Results   Component Value Date/Time    GrBStrep, External negative 2018     Information for the patient's :  Sangeetha Farrell [[id number]]   No results found for: ABORH, PCTABR, PCTDIG, BILI, ABORHEXT, ABORH    No results found for: APH, APCO2, APO2, AHCO3, ABEC, ABDC, O2ST, EPHV, PCO2V, PO2V, HCO3V, EBEV, EBDV, SITE, RSCOM

## 2018-05-08 NOTE — PROGRESS NOTES
Pt requests pain medication for verbalized pain level of 6/10. 10mg Roxicodone and 650mg Tylenol administered PO.

## 2018-05-08 NOTE — PROGRESS NOTES
Pt transferred to [facility] via w/c with infant in arms and accompanied by Ariela Steen RN. Pt stated she felt a little dizzy when standing up, but dizziness quickly resolved.

## 2018-05-08 NOTE — ANESTHESIA POSTPROCEDURE EVALUATION
Post-Anesthesia Evaluation and Assessment    Patient: Hola Sahni MRN: [id number]  SSN: [id number]    YOB: 1985  Age: 28 y.o. Sex: female       Cardiovascular Function/Vital Signs  Visit Vitals    /65    Pulse (!) 104    Temp 37.7 °C (99.8 °F)    Resp 16    Ht 5' 5\" (1.651 m)    Wt 68.9 kg (152 lb)    Breastfeeding Yes    BMI 25.29 kg/m2       Patient is status post epidural anesthesia for * No procedures listed *. Nausea/Vomiting: None    Postoperative hydration reviewed and adequate. Pain:  Pain Scale 1: Numeric (0 - 10) (05/07/18 2345)  Pain Intensity 1: 3 (05/07/18 1939)   Managed    Neurological Status:   Neuro (WDL): Within Defined Limits (05/07/18 2345)   At baseline    Mental Status and Level of Consciousness: Arousable    Pulmonary Status:   O2 Device: Room air (05/08/18 0148)   Adequate oxygenation and airway patent    Complications related to anesthesia: None    Post-anesthesia assessment completed.  No concerns    Signed By: Len Christine MD     May 8, 2018

## 2018-05-08 NOTE — PROGRESS NOTES
SBAR IN Report: Mother    Verbal report received from Jaycee Chen RN on this patient, who is now being transferred from L&D (unit) for routine progression of care. The patient is not wearing a green \"Anesthesia-Duramorph\" band. Report consisted of patient's Situation, Background, Assessment and Recommendations (SBAR).  ID bands were compared with the identification form, and verified with the patient and transferring nurse. Information from the SBAR and the [place] Report was reviewed with the transferring nurse; opportunity for questions and clarification provided.

## 2018-05-08 NOTE — PROGRESS NOTES
Pt continuously shaking. Difficult to read VS at this time.  Maternal HR manually checked at this time

## 2018-05-08 NOTE — PROGRESS NOTES
SBAR OUT Report: Mother    Verbal report given to Christopher Rand RN on this patient, who is now being transferred to [facility] for routine progression of care. The patient is not wearing a green \"Anesthesia-Duramorph\" band. Report consisted of patient's Situation, Background, Assessment and Recommendations (SBAR). [place] ID bands were compared with the identification form, and verified with the patient and receiving nurse. Information from the SBAR and the [facility] Report was reviewed with the receiving nurse; opportunity for questions and clarification provided.

## 2018-05-09 VITALS
TEMPERATURE: 98.9 F | OXYGEN SATURATION: 98 % | BODY MASS INDEX: 25.33 KG/M2 | RESPIRATION RATE: 16 BRPM | SYSTOLIC BLOOD PRESSURE: 96 MMHG | DIASTOLIC BLOOD PRESSURE: 62 MMHG | WEIGHT: 152 LBS | HEART RATE: 76 BPM | HEIGHT: 65 IN

## 2018-05-09 PROCEDURE — 90707 MMR VACCINE SC: CPT | Performed by: OBSTETRICS & GYNECOLOGY

## 2018-05-09 PROCEDURE — 74011250637 HC RX REV CODE- 250/637: Performed by: OBSTETRICS & GYNECOLOGY

## 2018-05-09 PROCEDURE — 74011250636 HC RX REV CODE- 250/636: Performed by: OBSTETRICS & GYNECOLOGY

## 2018-05-09 RX ORDER — IBUPROFEN 800 MG/1
800 TABLET ORAL
Qty: 90 TAB | Refills: 2 | Status: SHIPPED | OUTPATIENT
Start: 2018-05-09

## 2018-05-09 RX ORDER — OXYCODONE HYDROCHLORIDE 5 MG/1
5 TABLET ORAL
Qty: 10 TAB | Refills: 0 | Status: SHIPPED | OUTPATIENT
Start: 2018-05-09

## 2018-05-09 RX ADMIN — ACETAMINOPHEN 650 MG: 325 TABLET ORAL at 09:29

## 2018-05-09 RX ADMIN — MEASLES, MUMPS, AND RUBELLA VIRUS VACCINE LIVE 0.5 ML: 1000; 12500; 1000 INJECTION, POWDER, LYOPHILIZED, FOR SUSPENSION SUBCUTANEOUS at 10:50

## 2018-05-09 RX ADMIN — OXYCODONE HYDROCHLORIDE 10 MG: 5 TABLET ORAL at 09:29

## 2018-05-09 RX ADMIN — DOCUSATE SODIUM 100 MG: 100 CAPSULE, LIQUID FILLED ORAL at 09:29

## 2018-05-09 NOTE — PROGRESS NOTES
Progress Note                               Patient: Neida Streeter MRN: [id number]  SSN: [id number]    YOB: 1985  Age: 28 y.o. Sex: female      Postpartum Day Number 2    Subjective:     Patient doing well postpartum without significant complaints. Voiding without difficulty. Patient reports normal lochia. .  Pain well controlled, voiding on her own without difficulty. Breast feeding. Denies HA, SOB/CP, RUQ pain, Vision changes. Objective:     Patient Vitals for the past 12 hrs:   Temp Pulse Resp BP   18 0715 98.9 °F (37.2 °C) 76 16 96/62       Temp (24hrs), Av.6 °F (37 °C), Min:98.2 °F (36.8 °C), Max:98.9 °F (37.2 °C)      Physical Exam:    Constitutional: She appears well-developed and well-nourished. No distress. HENT:    Head: Normocephalic and atraumatic.    Cardiovascular: RRR  Pulmonary/Chest: CTAB  Abd:  S/NTTP/ND, BS normoactive, fundus firm at umbilicus  Ext:  No c/c/e      Lab/Data Review:  CBC:    Recent Labs      18   1819   WBC  15.3*   HGB  13.8   HCT  41.5   PLT  212     GBS:   Lab Results   Component Value Date/Time    GrBStrep, External negative 2018     Blood Type:   Lab Results   Component Value Date/Time    ABO/Rh(D) A POSITIVE 2018 06:19 PM    ABO,Rh A Positive 2017        Assessment and Plan:     28 y.o.  ppd# 2 s/p :     1) PP:  Meeting all pp goals, continue routine care; appropriate for DC home today  2) Breast feeding, Rh pos  3) Rub NI:  MMR prior to DC           Signed By: Emmy Fischer MD     May 9, 2018

## 2018-05-09 NOTE — DISCHARGE INSTRUCTIONS
After Your Delivery (the Postpartum Period): Care Instructions  Your Care Instructions    Congratulations on the birth of your baby. Like pregnancy, the  period can be a time of excitement, tri, and exhaustion. You may look at your wondrous little baby and feel happy. You may also be overwhelmed by your new sleep hours and new responsibilities. At first, babies often sleep during the days and are awake at night. They do not have a pattern or routine. They may make sudden gasps, jerk themselves awake, or look like they have crossed eyes. These are all normal, and they may even make you smile. In these first weeks after delivery, try to take good care of yourself. It may take 4 to 6 weeks to feel like yourself again, and possibly longer if you had a  birth. You will likely feel very tired for several weeks. Your days will be full of ups and downs, but lots of tri as well. Follow-up care is a key part of your treatment and safety. Be sure to make and go to all appointments, and call your doctor if you are having problems. It's also a good idea to know your test results and keep a list of the medicines you take. How can you care for yourself at home? Take care of your body after delivery  · Use pads instead of tampons for the bloody flow that may last as long as 2 weeks. · Ease cramps with ibuprofen (Advil, Motrin). · Ease soreness of hemorrhoids and the area between your vagina and rectum with ice compresses or witch hazel pads. · Ease constipation by drinking lots of fluid and eating high-fiber foods. Ask your doctor about over-the-counter stool softeners. · Cleanse yourself with a gentle squeeze of warm water from a bottle instead of wiping with toilet paper. · Take a sitz bath in warm water several times a day. · Wear a good nursing bra. Ease sore and swollen breasts with warm, wet washcloths. · If you are not breastfeeding, use ice rather than heat for breast soreness.   · Your period may not start for several months if you are breastfeeding. You may bleed more, and longer at first, than you did before you got pregnant. · Wait until you are healed (about 4 to 6 weeks) before you have sexual intercourse. Your doctor will tell you when it is okay to have sex. · Try not to travel with your baby for 5 or 6 weeks. If you take a long car trip, make frequent stops to walk around and stretch. Avoid exhaustion  · Rest every day. Try to nap when your baby naps. · Ask another adult to be with you for a few days after delivery. · Plan for  if you have other children. · Stay flexible so you can eat at odd hours and sleep when you need to. Both you and your baby are making new schedules. · Plan small trips to get out of the house. Change can make you feel less tired. · Ask for help with housework, cooking, and shopping. Remind yourself that your job is to care for your baby. Know about help for postpartum depression  · \"Baby blues\" are common for the first 1 to 2 weeks after birth. You may cry or feel sad or irritable for no reason. · Rest whenever you can. Being tired makes it harder to handle your emotions. · Go for walks with your baby. · Talk to your partner, friends, and family about your feelings. · If your symptoms last for more than a few weeks, or if you feel very depressed, ask your doctor for help. · Postpartum depression can be treated. Support groups and counseling can help. Sometimes medicine can also help. Stay healthy  · Eat healthy foods so you have more energy, make good breast milk, and lose extra baby pounds. · If you breastfeed, avoid alcohol and drugs. Stay smoke-free. If you quit during pregnancy, congratulations. · Start daily exercise after 4 to 6 weeks, but rest when you feel tired. · Learn exercises to tone your belly. Do Kegel exercises to regain strength in your pelvic muscles. You can do these exercises while you stand or sit.   ¨ Squeeze the same muscles you would use to stop your urine. Your belly and thighs should not move. ¨ Hold the squeeze for 3 seconds, and then relax for 3 seconds. ¨ Start with 3 seconds. Then add 1 second each week until you are able to squeeze for 10 seconds. ¨ Repeat the exercise 10 to 15 times for each session. Do three or more sessions each day. · Find a class for new mothers and new babies that has an exercise time. · If you had a  birth, give yourself a bit more time before you exercise, and be careful. When should you call for help? Call 911 anytime you think you may need emergency care. For example, call if:  ? · You passed out (lost consciousness). ?Call your doctor now or seek immediate medical care if:  ? · You have severe vaginal bleeding. This means you are passing blood clots and soaking through a pad each hour for 2 or more hours. ? · You are dizzy or lightheaded, or you feel like you may faint. ? · You have a fever. ? · You have new belly pain, or your pain gets worse. ? Watch closely for changes in your health, and be sure to contact your doctor if:  ? · Your vaginal bleeding seems to be getting heavier. ? · You have new or worse vaginal discharge. ? · You feel sad, anxious, or hopeless for more than a few days. ? · You do not get better as expected. Where can you learn more? Go to http://devin-radha.info/. Enter A461 in the search box to learn more about \"After Your Delivery (the Postpartum Period): Care Instructions. \"  Current as of: 2017  Content Version: 11.4  © 5885-4130 [employer]. Care instructions adapted under license by [employer] (which disclaims liability or warranty for this information). If you have questions about a medical condition or this instruction, always ask your healthcare professional. [address] any warranty or liability for your use of this information.

## 2018-05-09 NOTE — DISCHARGE SUMMARY
Obstetrical Discharge Summary     Name: Bob Calvin MRN: [id number]  SSN: [id number]    YOB: 1985  Age: 28 y.o. Sex: female      Allergies: Review of patient's allergies indicates no known allergies. Admit Date: 2018    Discharge Date: 2018     Admitting Physician: Vu Bethea MD     Attending Physician:  Tia Del Toro MD     * Admission Diagnoses: LABOR;Normal labor    * Discharge Diagnoses:   Information for the patient's :  Sangeetha Farrell [[id number]]   Delivery of a 7 lb 3.5 oz (3.275 kg) female infant via [address], Spontaneous on 2018 at 11:26 PM  by . Apgars were 9 and 9. Additional Diagnoses:   Hospital Problems as of 2018  Date Reviewed: 2018          Codes Class Noted - Resolved POA    Normal labor ICD-10-CM: O80, Z37.9  ICD-9-CM: 107  2018 - Present Unknown             Lab Results   Component Value Date/Time    ABO/Rh(D) A POSITIVE 2018 06:19 PM    Rubella, External Non-Immune 2017    GrBStrep, External negative 2018    ABO,Rh A Positive 2017      Immunization History   Administered Date(s) Administered    Influenza Vaccine (Quad) Mdck Pf 2017    MMR 2018       * Procedures:      [place]  Depression Scale  I have been able to laugh and see the funny side of things: As much as I always could  I have looked forward with enjoyment to things: As much as I ever did  I have blamed myself unnecessarily when things went wrong: Yes, some of the time  I have been anxious or worried for no good reason: Yes, sometimes  I have felt scared or panicky for no very good reason: Yes, sometimes  Things have been getting on top of me: Yes, most of the time I haven't been able to cope at all  I have been so unhappy that I have had difficulty sleeping: Yes, sometimes  I have felt sad or miserable: Yes, quite often  I have been so unhappy that I have been crying:  Only occasionally  The thought of harming myself has occurred to me: Never  Total Score: 14    * Discharge Condition: good    [facility] Course: Normal hospital course following the delivery. * Disposition: Home    Discharge Medications:   Current Discharge Medication List      START taking these medications    Details   oxyCODONE IR (ROXICODONE) 5 mg immediate release tablet Take 1 Tab by mouth every four (4) hours as needed for Pain. Max Daily Amount: 30 mg.  Qty: 10 Tab, Refills: 0    Associated Diagnoses: Normal labor      ibuprofen (MOTRIN) 800 mg tablet Take 1 Tab by mouth every eight (8) hours as needed for Pain. Qty: 90 Tab, Refills: 2         CONTINUE these medications which have NOT CHANGED    Details   AMBULATORY BREAST PUMP Use as directed for feedings. Qty: 1 Pump(s), Refills: 0      ferrous sulfate (SLOW FE) 142 mg (45 mg iron) ER tablet Take  by mouth Daily (before breakfast). ascorbic acid, vitamin C, (VITAMIN C) 500 mg tablet Take 1 Tab by mouth daily. Qty: 30 Tab, Refills: 12      PNV 67-iron ps-folate no. 1-dha (VITAFOL ULTRA) 29 mg iron- 1 mg-200 mg cap Take 1 Tab by mouth daily. Qty: 90 Cap, Refills: 4    Associated Diagnoses: Encounter for supervision of other normal pregnancy in first trimester             * Follow-up Care/Patient Instructions:   Activity: No sex for 6 weeks, No driving while on analgesics and No heavy lifting for 6 weeks  Diet: Regular Diet  Wound Care: Keep wound clean and dry    Follow-up Information     Follow up With Details Comments [address] [facility] Jackson Arceo MD Schedule an appointment as soon as possible for a visit in 10 [facility] MD Soumya   Patient can only remember the practice name and not the physician             Signed By:  Moraima Vazquez MD     May 9, 2018

## 2018-05-09 NOTE — PROGRESS NOTES
Discharge instructions completed. Patient voiced understanding. Prescription given.   Waiting for transportation

## 2018-05-09 NOTE — PROGRESS NOTES
SW follow-up due to EPDS score of 14. Patient was provided education/resources on PPD yesterday. Patient denies any history of depression/anxiety, and states that she was \"hurting a lot when the contractions started. \"   offered multiple times to provide the EPDS in patient's native language, but patient declined. I am unsure if score of 14 is due to possible language barrier. Patient has strong support system of family. OB notified of EPDS score via fax.     Ara Chavarria, [address] [facility]

## 2018-05-30 ENCOUNTER — TELEPHONE (OUTPATIENT)
Dept: CASE MANAGEMENT | Age: 33
End: 2018-05-30

## 2018-05-30 NOTE — TELEPHONE ENCOUNTER
Phone call to patient at [phone]. Patient states that everything has been going well and denies any presence of postpartum depression. Patient denied any needs from .     Cezar Houston, [address] [facility]

## 2018-07-06 PROBLEM — O99.012 ANEMIA DURING PREGNANCY IN SECOND TRIMESTER: Status: RESOLVED | Noted: 2018-02-15 | Resolved: 2018-07-06

## 2018-10-31 ENCOUNTER — TELEPHONE (OUTPATIENT)
Dept: CASE MANAGEMENT | Age: 33
End: 2018-10-31

## 2018-10-31 NOTE — TELEPHONE ENCOUNTER
Phone call to patient at [phone]. Patient states that everything has been going well and denies any presence of postpartum depression.   Patient denied any needs from .  [address] [facility], [address]

## 2018-12-11 PROBLEM — Z37.9 NORMAL LABOR: Status: RESOLVED | Noted: 2018-05-07 | Resolved: 2018-12-11
